# Patient Record
Sex: FEMALE | Race: WHITE | NOT HISPANIC OR LATINO | Employment: OTHER | URBAN - METROPOLITAN AREA
[De-identification: names, ages, dates, MRNs, and addresses within clinical notes are randomized per-mention and may not be internally consistent; named-entity substitution may affect disease eponyms.]

---

## 2021-07-16 ENCOUNTER — TELEPHONE (OUTPATIENT)
Dept: DERMATOLOGY | Facility: CLINIC | Age: 66
End: 2021-07-16

## 2021-07-16 NOTE — TELEPHONE ENCOUNTER
Thu 7/15/2021 4:00 PM  Patient LM wanting to schedule a new patient appointment    Returned call, left message

## 2021-07-20 PROBLEM — L40.50 PSORIATIC ARTHROPATHY (HCC): Status: ACTIVE | Noted: 2021-07-20

## 2021-07-20 PROBLEM — M47.816 LUMBAR SPONDYLOSIS: Status: ACTIVE | Noted: 2021-07-20

## 2021-12-02 PROBLEM — G43.009 MIGRAINE WITHOUT AURA AND WITHOUT STATUS MIGRAINOSUS, NOT INTRACTABLE: Status: ACTIVE | Noted: 2021-12-02

## 2021-12-02 PROBLEM — L40.59 OTHER PSORIATIC ARTHROPATHY (HCC): Status: ACTIVE | Noted: 2021-07-20

## 2021-12-02 PROBLEM — L40.0 PLAQUE PSORIASIS: Status: ACTIVE | Noted: 2021-12-02

## 2021-12-02 PROBLEM — F32.A ANXIETY AND DEPRESSION: Status: ACTIVE | Noted: 2021-12-02

## 2021-12-02 PROBLEM — F41.9 ANXIETY AND DEPRESSION: Status: ACTIVE | Noted: 2021-12-02

## 2021-12-02 PROBLEM — E78.5 HYPERLIPIDEMIA: Status: ACTIVE | Noted: 2021-12-02

## 2022-07-09 ENCOUNTER — TELEPHONE ENCOUNTER (OUTPATIENT)
Dept: URBAN - METROPOLITAN AREA CLINIC 121 | Facility: CLINIC | Age: 67
End: 2022-07-09

## 2022-07-09 RX ORDER — SIMVASTATIN 40 MG/1
TABLET, FILM COATED ORAL
Refills: 0 | OUTPATIENT
Start: 2011-02-28 | End: 2011-04-21

## 2022-07-09 RX ORDER — PREDNISONE 5 MG/1
TABLET ORAL
Refills: 0 | OUTPATIENT
Start: 2011-02-28 | End: 2011-04-21

## 2022-07-10 ENCOUNTER — TELEPHONE ENCOUNTER (OUTPATIENT)
Dept: URBAN - METROPOLITAN AREA CLINIC 121 | Facility: CLINIC | Age: 67
End: 2022-07-10

## 2022-07-10 RX ORDER — SIMVASTATIN 40 MG/1
TABLET, FILM COATED ORAL
Refills: 0 | Status: ACTIVE | COMMUNITY
Start: 2011-04-21

## 2022-07-10 RX ORDER — HYDROCORTISONE ACETATE 25 MG/1
SUPPOSITORY RECTAL ONCE A DAY
Refills: 2 | Status: ACTIVE | COMMUNITY
Start: 2011-02-28

## 2022-07-10 RX ORDER — PREDNISONE 5 MG/1
TABLET ORAL
Refills: 0 | Status: ACTIVE | COMMUNITY
Start: 2011-04-21

## 2024-02-24 LAB
ALBUMIN SERPL-MCNC: 4.6 G/DL (ref 3.6–5.1)
ALBUMIN/GLOB SERPL: 1.8 (CALC) (ref 1–2.5)
ALP SERPL-CCNC: 65 U/L (ref 37–153)
ALT SERPL-CCNC: 28 U/L (ref 6–29)
APPEARANCE UR: CLEAR
AST SERPL-CCNC: 29 U/L (ref 10–35)
BACTERIA UR QL AUTO: ABNORMAL /HPF
BASOPHILS # BLD AUTO: 30 CELLS/UL (ref 0–200)
BASOPHILS NFR BLD AUTO: 0.4 %
BILIRUB SERPL-MCNC: 1 MG/DL (ref 0.2–1.2)
BILIRUB UR QL STRIP: NEGATIVE
BUN SERPL-MCNC: 11 MG/DL (ref 7–25)
BUN/CREAT SERPL: ABNORMAL (CALC) (ref 6–22)
CALCIUM SERPL-MCNC: 9.6 MG/DL (ref 8.6–10.4)
CHLORIDE SERPL-SCNC: 104 MMOL/L (ref 98–110)
CO2 SERPL-SCNC: 28 MMOL/L (ref 20–32)
COLOR UR: YELLOW
CREAT SERPL-MCNC: 0.73 MG/DL (ref 0.6–1)
CRP SERPL-MCNC: 1.4 MG/L
EOSINOPHIL # BLD AUTO: 203 CELLS/UL (ref 15–500)
EOSINOPHIL NFR BLD AUTO: 2.7 %
ERYTHROCYTE [DISTWIDTH] IN BLOOD BY AUTOMATED COUNT: 12.4 % (ref 11–15)
ERYTHROCYTE [SEDIMENTATION RATE] IN BLOOD BY WESTERGREN METHOD: 2 MM/H
GFR/BSA.PRED SERPLBLD CYS-BASED-ARV: 88 ML/MIN/1.73M2
GLOBULIN SER CALC-MCNC: 2.5 G/DL (CALC) (ref 1.9–3.7)
GLUCOSE SERPL-MCNC: 103 MG/DL (ref 65–99)
GLUCOSE UR QL STRIP: NEGATIVE
HCT VFR BLD AUTO: 42.8 % (ref 35–45)
HGB BLD-MCNC: 14.5 G/DL (ref 11.7–15.5)
HGB UR QL STRIP: NEGATIVE
HYALINE CASTS #/AREA URNS LPF: ABNORMAL /LPF
KETONES UR QL STRIP: NEGATIVE
LEUKOCYTE ESTERASE UR QL STRIP: ABNORMAL
LYMPHOCYTES # BLD AUTO: 2873 CELLS/UL (ref 850–3900)
LYMPHOCYTES NFR BLD AUTO: 38.3 %
M TB IFN-G CD4+ BCKGRND COR BLD-ACNC: 0.07 IU/ML
M TB IFN-G CD4+ BCKGRND COR BLD-ACNC: 0.08 IU/ML
M TB IFN-G CD4+ T-CELLS BLD-ACNC: 0.03 IU/ML
M TB TUBERC IFN-G BLD QL: NEGATIVE
M TB TUBERC IGNF/MITOGEN IGNF CONTROL: 9.41 IU/ML
MCH RBC QN AUTO: 30.3 PG (ref 27–33)
MCHC RBC AUTO-ENTMCNC: 33.9 G/DL (ref 32–36)
MCV RBC AUTO: 89.4 FL (ref 80–100)
MONOCYTES # BLD AUTO: 600 CELLS/UL (ref 200–950)
MONOCYTES NFR BLD AUTO: 8 %
NEUTROPHILS # BLD AUTO: 3795 CELLS/UL (ref 1500–7800)
NEUTROPHILS NFR BLD AUTO: 50.6 %
NITRITE UR QL STRIP: NEGATIVE
PH UR STRIP: 6.5 [PH] (ref 5–8)
PLATELET # BLD AUTO: 289 THOUSAND/UL (ref 140–400)
PMV BLD REES-ECKER: 11 FL (ref 7.5–12.5)
POTASSIUM SERPL-SCNC: 4.3 MMOL/L (ref 3.5–5.3)
PROT SERPL-MCNC: 7.1 G/DL (ref 6.1–8.1)
PROT UR QL STRIP: NEGATIVE
RBC # BLD AUTO: 4.79 MILLION/UL (ref 3.8–5.1)
RBC #/AREA URNS HPF: ABNORMAL /HPF
SODIUM SERPL-SCNC: 139 MMOL/L (ref 135–146)
SP GR UR STRIP: 1.01 (ref 1–1.03)
SQUAMOUS #/AREA URNS HPF: ABNORMAL /HPF
WBC # BLD AUTO: 7.5 THOUSAND/UL (ref 3.8–10.8)
WBC #/AREA URNS HPF: ABNORMAL /HPF

## 2024-02-29 ENCOUNTER — OFFICE VISIT (OUTPATIENT)
Age: 69
End: 2024-02-29

## 2024-02-29 VITALS
HEIGHT: 64 IN | HEART RATE: 85 BPM | DIASTOLIC BLOOD PRESSURE: 60 MMHG | OXYGEN SATURATION: 99 % | BODY MASS INDEX: 27.42 KG/M2 | TEMPERATURE: 97.4 F | SYSTOLIC BLOOD PRESSURE: 114 MMHG | WEIGHT: 160.6 LBS

## 2024-02-29 DIAGNOSIS — M47.816 LUMBAR SPONDYLOSIS: ICD-10-CM

## 2024-02-29 DIAGNOSIS — L40.59 OTHER PSORIATIC ARTHROPATHY (HCC): Primary | ICD-10-CM

## 2024-02-29 DIAGNOSIS — Z79.899 ENCOUNTER FOR LONG-TERM (CURRENT) USE OF OTHER MEDICATIONS: ICD-10-CM

## 2024-02-29 NOTE — PROGRESS NOTES
Assessment/Plan:    Other psoriatic arthropathy (HCC)  Her psoriatic arthritis is well-controlled with Taltz.  No signs of active inflammation or synovitis on exam today.  We will continue to monitor her response to treatment.  Labs as ordered to monitor for medication side effects and toxicity.  She is up-to-date with her QuantiFERON gold testing.  Follow-up in 4 to 6 months or sooner if needed.    Lumbar spondylosis  Chronic lower back pain is generally stable.  Continue regular exercise and stretching program.    Migraine without aura and without status migrainosus, not intractable  Migraines are generally stable.  Follow-up with PCP.       Diagnoses and all orders for this visit:    Other psoriatic arthropathy (HCC)  -     CBC and differential; Future  -     Comprehensive metabolic panel; Future  -     Sedimentation rate, automated; Future  -     C-reactive protein; Future  -     UA (URINE) with reflex to Scope    Encounter for long-term (current) use of other medications  -     CBC and differential; Future  -     Comprehensive metabolic panel; Future  -     Sedimentation rate, automated; Future  -     C-reactive protein; Future  -     UA (URINE) with reflex to Scope    Lumbar spondylosis        Spent 30 minutes total reviewing labs, chart notes, imaging studies, performing history and physical exam, discussing medication and treatment, counseling patient, and completing chart note.          Subjective:      Patient ID: Claire Bejarano is a 69 y.o. female.    She is here for follow-up of her psoriatic arthritis.  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  She has previously been treated with Remicade, Enbrel, Humira, Arava, and methotrexate.  She is currently being treated with Taltz which she started in April 2021.  She is feeling well and her psoriatic arthritis symptoms are well-controlled with Taltz.  She has no significant joint pain.  She has no swelling in her joints and no signs of  "dactylitis or enthesitis.  Her psoriasis is quiescent.    She has a history of chronic lower back pain with occasional radicular symptoms.  She did have an x-ray done of her lumbar spine on 5/2/2023.  This revealed mild scoliosis along with degenerative changes.  She has no signs of inflammatory spondyloarthritis.  Her symptoms are most noticeable with certain activities such as cleaning however she does get relief with rest.  She is consistent with a stretching program which does help her symptoms.  She also stays very active.  She has a history of migraines however these are generally stable.    She had labs done on 2/21/2024.  CBC unremarkable.  Glucose 103.  Creatinine 0.73, GFR 88.  ESR, CRP within normal limits.  QuantiFERON gold negative.        The following portions of the patient's history were reviewed and updated as appropriate: allergies, current medications, past family history, past medical history, past social history, past surgical history, and problem list.    Review of Systems   Constitutional:  Positive for fatigue. Negative for chills and fever.   HENT:  Negative for hearing loss, sore throat and tinnitus.    Eyes:  Negative for pain and visual disturbance.   Respiratory:  Negative for cough and shortness of breath.    Gastrointestinal:  Negative for abdominal pain, nausea and vomiting.   Genitourinary:  Negative for difficulty urinating.   Musculoskeletal:  Positive for arthralgias and back pain. Negative for gait problem, joint swelling, myalgias, neck pain and neck stiffness.   Skin:  Negative for rash.   Neurological:  Positive for headaches (migraines). Negative for dizziness, seizures, weakness and numbness.   Psychiatric/Behavioral:  Negative for confusion, decreased concentration and sleep disturbance.          Objective:      /60 (BP Location: Left arm, Patient Position: Sitting, Cuff Size: Adult)   Pulse 85   Temp (!) 97.4 °F (36.3 °C) (Tympanic)   Ht 5' 3.75\" (1.619 m)   Wt " 72.8 kg (160 lb 9.6 oz)   SpO2 99%   BMI 27.78 kg/m²          Physical Exam  Constitutional:       Appearance: Normal appearance.   Cardiovascular:      Rate and Rhythm: Normal rate and regular rhythm.   Pulmonary:      Breath sounds: Normal breath sounds.   Musculoskeletal:         General: No swelling.      Comments: Slightly decreased lateral flexion neck.  Full range of motion bilateral shoulders, elbows.  Decreased flexion, decreased extension bilateral wrists.  Multiple deformities scattered PIP joints.  Interphalangeal OA changes, squaring 1st CMC joints bilaterally.  Boutonniere deformity right middle finger, right ring finger.  Full range of motion bilateral hips, knees, ankles.  Patellofemoral crepitus noted bilateral knees.  Rearfoot hyperpronation, hammertoe deformities, midfoot cavus deformities bilateral feet.   Skin:     General: Skin is warm and dry.   Neurological:      General: No focal deficit present.      Mental Status: She is alert.         Dragon Dictation software was used to dictate this note. It may contain errors with dictating incorrect words/spelling. Please contact provider directly for any questions.

## 2024-02-29 NOTE — ASSESSMENT & PLAN NOTE
Her psoriatic arthritis is well-controlled with Taltz.  No signs of active inflammation or synovitis on exam today.  We will continue to monitor her response to treatment.  Labs as ordered to monitor for medication side effects and toxicity.  She is up-to-date with her QuantiFERON gold testing.  Follow-up in 4 to 6 months or sooner if needed.

## 2024-06-16 DIAGNOSIS — L40.59 POLYARTICULAR PSORIATIC ARTHRITIS (HCC): ICD-10-CM

## 2024-06-16 RX ORDER — IXEKIZUMAB 80 MG/ML
INJECTION, SOLUTION SUBCUTANEOUS
Qty: 3 ML | Refills: 1 | Status: SHIPPED | OUTPATIENT
Start: 2024-06-16

## 2024-08-09 LAB
CRP SERPL-MCNC: 3.9 MG/L
ERYTHROCYTE [SEDIMENTATION RATE] IN BLOOD BY WESTERGREN METHOD: 2 MM/H

## 2024-08-28 NOTE — PROGRESS NOTES
Ambulatory Visit  Name: Claire Bejarano      : 1955      MRN: [id number]  Encounter Provider: Nila Malik PA-C  Encounter Date: 2024   Encounter department: [facility] RHEUMATOLOGY [facility]    Assessment & Plan   1. Other psoriatic arthropathy (HCC)  Assessment & Plan:  Her psoriatic arthritis symptoms are well-controlled with Taltz.  No signs of active inflammation or synovitis on exam today.  We will continue to monitor her response to treatment.  Labs as ordered to monitor for medication side effects and toxicity.  She is up-to-date with her QuantiFERON gold testing.  Follow-up in 4 to 6 months or sooner if needed.  Orders:  -     CBC and differential; Future  -     Comprehensive metabolic panel; Future  -     Sedimentation rate, automated; Future  -     C-reactive protein; Future  -     Quantiferon TB Gold Plus Assay; Future  2. Lumbar spondylosis  Assessment & Plan:  Chronic lower back pain due to lumbar spondylosis.  Her symptoms are generally stable.  Encouraged continued home exercise program.  3. Encounter for long-term (current) use of other medications  -     CBC and differential; Future  -     Comprehensive metabolic panel; Future  -     Sedimentation rate, automated; Future  -     C-reactive protein; Future  -     Quantiferon TB Gold Plus Assay; Future      History of Present Illness     Claire Bejarano is a 69 y.o. female.  She is here for follow-up of her psoriatic arthritis.  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  She has previously been treated with Remicade, Enbrel, Humira, Arava, and methotrexate.  She is currently being treated with Taltz which she started in 2021.  She is feeling well and her psoriatic arthritis symptoms are stable with Taltz.  She has minimal stiffness in the morning.  She has no swelling in her joints.  She has no signs of dactylitis or enthesitis.  Her psoriasis is quiescent as well.     She has a history of  chronic lower back pain with occasional radicular symptoms.  She did have an x-ray done of her lumbar spine on 5/2/2023.  This revealed mild scoliosis along with degenerative changes.  She has no signs of inflammatory spondyloarthritis.  Her symptoms are most noticeable with certain activities such as cleaning however she does get relief with rest.  She is consistent with a stretching program which does help her symptoms.  She also stays very active.  She has a history of migraines however these are generally stable.      She had labs done on 8/8/2024.  CBC, CMP unremarkable.  ESR, CRP within normal limits.  Vitamin D 44.  She is up-to-date with her QuantiFERON gold testing and had a negative test on 2/21/2024.    Review of Systems   Constitutional:  Positive for fatigue. Negative for chills and fever.   HENT:  Negative for hearing loss, sore throat and tinnitus.    Eyes:  Negative for pain and visual disturbance.   Respiratory:  Negative for cough and shortness of breath.    Gastrointestinal:  Negative for abdominal pain, nausea and vomiting.   Genitourinary:  Negative for difficulty urinating.   Musculoskeletal:  Positive for arthralgias and back pain. Negative for gait problem, joint swelling, myalgias, neck pain and neck stiffness.   Skin:  Negative for rash.   Neurological:  Positive for headaches (migraines). Negative for dizziness, seizures, weakness and numbness.   Psychiatric/Behavioral:  Negative for confusion, decreased concentration and sleep disturbance.      Current Outpatient Medications on File Prior to Visit   Medication Sig Dispense Refill    Cholecalciferol (Vitamin D) 50 MCG (2000 UT) tablet Take 2,000 Units by mouth daily      ixekizumab (Taltz) 80 MG/ML subcutaneous injection INJECT 80MG (1 PEN) UNDER THE SKIN EVERY 4 WEEKS 3 mL 1    omeprazole (PriLOSEC) 20 mg delayed release capsule Take 20 mg by mouth daily      rosuvastatin (CRESTOR) 10 MG tablet Take 10 mg by mouth daily      SUMAtriptan  "(IMITREX) 50 mg tablet Take 50 mg by mouth once as needed for migraine      venlafaxine (EFFEXOR) 75 mg tablet Take 75 mg by mouth daily      escitalopram (LEXAPRO) 5 mg tablet Take 5 mg by mouth daily (Patient not taking: Reported on 8/9/2022)       MG tablet TAKE 1 TABLET EVERY 6 HOURS AS NEEDED FOR MILD PAIN (Patient not taking: Reported on 2/29/2024) 120 tablet 5     No current facility-administered medications on file prior to visit.      Objective     /78 (BP Location: Left arm, Patient Position: Sitting, Cuff Size: Adult)   Pulse 78   Temp 97.8 °F (36.6 °C) (Tympanic)   Ht 5' 4\" (1.626 m)   Wt 70.8 kg (156 lb)   SpO2 96%   BMI 26.78 kg/m²     Physical Exam  Constitutional:       Appearance: Normal appearance.   Cardiovascular:      Rate and Rhythm: Normal rate and regular rhythm.   Pulmonary:      Breath sounds: Normal breath sounds.   Musculoskeletal:      Comments: Slightly decreased lateral flexion neck.  Full range of motion bilateral shoulders, elbows.  Decreased flexion, decreased extension bilateral wrists.  Multiple deformities scattered PIP joints.  Interphalangeal OA changes, squaring 1st CMC joints bilaterally.  Boutonniere deformity right middle finger, right ring finger.  Full range of motion bilateral hips, knees, ankles.  Patellofemoral crepitus noted bilateral knees.  Rearfoot hyperpronation, hammertoe deformities, midfoot cavus deformities bilateral feet.    Skin:     General: Skin is warm and dry.   Neurological:      General: No focal deficit present.      Mental Status: She is alert.       Administrative Statements         Dragon Dictation software was used to dictate this note. It may contain errors with dictating incorrect words/spelling. Please contact provider directly for any questions.    "

## 2024-08-29 ENCOUNTER — OFFICE VISIT (OUTPATIENT)
Age: 69
End: 2024-08-29
Payer: COMMERCIAL

## 2024-08-29 VITALS
SYSTOLIC BLOOD PRESSURE: 116 MMHG | BODY MASS INDEX: 26.63 KG/M2 | TEMPERATURE: 97.8 F | HEART RATE: 78 BPM | HEIGHT: 64 IN | OXYGEN SATURATION: 96 % | WEIGHT: 156 LBS | DIASTOLIC BLOOD PRESSURE: 78 MMHG

## 2024-08-29 DIAGNOSIS — M47.816 LUMBAR SPONDYLOSIS: ICD-10-CM

## 2024-08-29 DIAGNOSIS — L40.59 OTHER PSORIATIC ARTHROPATHY (HCC): Primary | ICD-10-CM

## 2024-08-29 DIAGNOSIS — Z79.899 ENCOUNTER FOR LONG-TERM (CURRENT) USE OF OTHER MEDICATIONS: ICD-10-CM

## 2024-08-29 PROCEDURE — 99214 OFFICE O/P EST MOD 30 MIN: CPT | Performed by: PHYSICIAN ASSISTANT

## 2024-08-29 NOTE — ASSESSMENT & PLAN NOTE
Chronic lower back pain due to lumbar spondylosis.  Her symptoms are generally stable.  Encouraged continued home exercise program.

## 2024-08-29 NOTE — ASSESSMENT & PLAN NOTE
Her psoriatic arthritis symptoms are well-controlled with Taltz.  No signs of active inflammation or synovitis on exam today.  We will continue to monitor her response to treatment.  Labs as ordered to monitor for medication side effects and toxicity.  She is up-to-date with her QuantiFERON gold testing.  Follow-up in 4 to 6 months or sooner if needed.

## 2024-11-08 DIAGNOSIS — L40.59 POLYARTICULAR PSORIATIC ARTHRITIS (HCC): ICD-10-CM

## 2024-11-08 RX ORDER — IXEKIZUMAB 80 MG/ML
INJECTION, SOLUTION SUBCUTANEOUS
Qty: 3 ML | Refills: 0 | Status: SHIPPED | OUTPATIENT
Start: 2024-11-08

## 2024-12-30 ENCOUNTER — TELEPHONE (OUTPATIENT)
Age: 69
End: 2024-12-30

## 2025-01-06 ENCOUNTER — TELEPHONE (OUTPATIENT)
Age: 70
End: 2025-01-06

## 2025-01-06 NOTE — TELEPHONE ENCOUNTER
Patient is calling because they would like to have their blood work mailed to them as they go [facility] thank you

## 2025-01-14 ENCOUNTER — PREP FOR PROCEDURE (OUTPATIENT)
Age: 70
End: 2025-01-14

## 2025-01-14 ENCOUNTER — TELEPHONE (OUTPATIENT)
Age: 70
End: 2025-01-14

## 2025-01-14 DIAGNOSIS — Z12.11 SCREENING FOR COLON CANCER: Primary | ICD-10-CM

## 2025-01-14 NOTE — TELEPHONE ENCOUNTER
Scheduled date of colonoscopy (as of today): 2/4/25  Physician performing colonoscopy:   Location of colonoscopy: [place]  Bowel prep reviewed with patient: Miralax Dulcolax prep instructions reviewed and sent via mail  Instructions reviewed with patient by: md  Clearances:       Patient requesting prep instructions sent via mail. Home address verified.

## 2025-01-14 NOTE — TELEPHONE ENCOUNTER
25  Screened by: Elise Forbes MA    Referring Provider Douglas Shoenberger    Pre- Screenin'4  127 lbs  BMI  21.8  Has patient been referred for a routine screening Colonoscopy? yes  Is the patient between 45-75 years old? yes      Previous Colonoscopy yes   If yes:    Date:     Facility: facility in [place]     Reason: Screening      Does the patient want to see a Gastroenterologist prior to their procedure OR are they having any GI symptoms? no    Has the patient been hospitalized or had abdominal surgery in the past 6 months? no    Does the patient use supplemental oxygen? no    Does the patient take Coumadin, Lovenox, Plavix, Elliquis, Xarelto, or other blood thinning medication? no    Has the patient had a stroke, cardiac event, or stent placed in the past year? no      If patient is between 45yrs - 49yrs, please advise patient that we will have to confirm benefits & coverage with their insurance company for a routine screening colonoscopy.

## 2025-01-27 ENCOUNTER — TELEPHONE (OUTPATIENT)
Dept: GASTROENTEROLOGY | Facility: CLINIC | Age: 70
End: 2025-01-27

## 2025-01-27 NOTE — TELEPHONE ENCOUNTER
Procedure confirmed  Colonoscopy     Via: Spoke with patient    Instructions given: Mail     Prep Given: Miralax/Dulcolax    Call the office if there are any questions.

## 2025-02-04 ENCOUNTER — ANESTHESIA EVENT (OUTPATIENT)
Dept: GASTROENTEROLOGY | Facility: HOSPITAL | Age: 70
End: 2025-02-04
Payer: COMMERCIAL

## 2025-02-04 ENCOUNTER — HOSPITAL ENCOUNTER (OUTPATIENT)
Dept: GASTROENTEROLOGY | Facility: HOSPITAL | Age: 70
Setting detail: OUTPATIENT SURGERY
Discharge: HOME | End: 2025-02-04
Attending: INTERNAL MEDICINE
Payer: COMMERCIAL

## 2025-02-04 ENCOUNTER — ANESTHESIA (OUTPATIENT)
Dept: GASTROENTEROLOGY | Facility: HOSPITAL | Age: 70
End: 2025-02-04
Payer: COMMERCIAL

## 2025-02-04 VITALS
DIASTOLIC BLOOD PRESSURE: 88 MMHG | OXYGEN SATURATION: 99 % | RESPIRATION RATE: 14 BRPM | SYSTOLIC BLOOD PRESSURE: 141 MMHG | HEART RATE: 71 BPM | TEMPERATURE: 97.5 F

## 2025-02-04 DIAGNOSIS — Z12.11 SCREENING FOR COLON CANCER: ICD-10-CM

## 2025-02-04 PROCEDURE — G0121 COLON CA SCRN NOT HI RSK IND: HCPCS | Performed by: STUDENT IN AN ORGANIZED HEALTH CARE EDUCATION/TRAINING PROGRAM

## 2025-02-04 RX ORDER — LIDOCAINE HYDROCHLORIDE 10 MG/ML
INJECTION, SOLUTION EPIDURAL; INFILTRATION; INTRACAUDAL; PERINEURAL AS NEEDED
Status: DISCONTINUED | OUTPATIENT
Start: 2025-02-04 | End: 2025-02-04

## 2025-02-04 RX ORDER — PROPOFOL 10 MG/ML
INJECTION, EMULSION INTRAVENOUS AS NEEDED
Status: DISCONTINUED | OUTPATIENT
Start: 2025-02-04 | End: 2025-02-04

## 2025-02-04 RX ORDER — SODIUM CHLORIDE 9 MG/ML
INJECTION, SOLUTION INTRAVENOUS CONTINUOUS PRN
Status: DISCONTINUED | OUTPATIENT
Start: 2025-02-04 | End: 2025-02-04

## 2025-02-04 RX ADMIN — PROPOFOL 50 MG: 10 INJECTION, EMULSION INTRAVENOUS at 10:33

## 2025-02-04 RX ADMIN — PROPOFOL 30 MG: 10 INJECTION, EMULSION INTRAVENOUS at 10:42

## 2025-02-04 RX ADMIN — LIDOCAINE HYDROCHLORIDE 50 MG: 10 INJECTION, SOLUTION EPIDURAL; INFILTRATION; INTRACAUDAL; PERINEURAL at 10:33

## 2025-02-04 RX ADMIN — PROPOFOL 50 MG: 10 INJECTION, EMULSION INTRAVENOUS at 10:36

## 2025-02-04 RX ADMIN — PROPOFOL 30 MG: 10 INJECTION, EMULSION INTRAVENOUS at 10:39

## 2025-02-04 RX ADMIN — PROPOFOL 30 MG: 10 INJECTION, EMULSION INTRAVENOUS at 10:45

## 2025-02-04 RX ADMIN — PROPOFOL 30 MG: 10 INJECTION, EMULSION INTRAVENOUS at 10:51

## 2025-02-04 RX ADMIN — SODIUM CHLORIDE: 0.9 INJECTION, SOLUTION INTRAVENOUS at 10:17

## 2025-02-04 RX ADMIN — PROPOFOL 30 MG: 10 INJECTION, EMULSION INTRAVENOUS at 10:48

## 2025-02-04 NOTE — H&P
History and Physical -  [facility]  Claire Bejarano 70 y.o. female MRN: [id number]    HPI: Claire Bejarano is a 70 y.o. female who presents for colonoscopy    REVIEW OF SYSTEMS: Per the HPI, and otherwise unremarkable.    Historical Information   Past Medical History:   Diagnosis Date    Anxiety and depression     Cervical cancer (HCC)     Hyperlipidemia     Lumbar spondylosis     Migraines     Osteoarthritis     Psoriasis     Psoriatic arthritis (HCC)     Uterine cancer (HCC)      Past Surgical History:   Procedure Laterality Date    PARTIAL HYSTERECTOMY       Social History   Social History     Substance and Sexual Activity   Alcohol Use Not Currently     Social History     Substance and Sexual Activity   Drug Use Not on file     Social History     Tobacco Use   Smoking Status Never   Smokeless Tobacco Never     Family History   Problem Relation Age of Onset    Arthritis Family     Diabetes Family     Cancer Family     Heart disease Family        Meds/Allergies       Current Outpatient Medications:     Cholecalciferol (Vitamin D) 50 MCG (2000 UT) tablet    rosuvastatin (CRESTOR) 10 MG tablet    SUMAtriptan (IMITREX) 50 mg tablet    venlafaxine (EFFEXOR) 75 mg tablet    Taltz 80 MG/ML subcutaneous injection    Allergies   Allergen Reactions    Methotrexate Confusion       Objective     /76   Pulse 85   Temp (!) 97.4 °F (36.3 °C) (Temporal)   Resp 18   SpO2 97%     PHYSICAL EXAM    General Appearance: NAD, cooperative, alert  Eyes: Anicteric  GI:  Soft, non-tender, non-distended; normal bowel sounds; no masses, no organomegaly   Rectal: Deferred until procedure  Musculoskeletal: No edema.  Skin:  No jaundice    ASSESSMENT/PLAN:  This is a 70 y.o. female here for colonoscopy, and she is stable and optimized for her procedure.

## 2025-02-04 NOTE — ANESTHESIA PREPROCEDURE EVALUATION
Procedure:  COLONOSCOPY    Relevant Problems   CARDIO   (+) Hyperlipidemia   (+) Migraine without aura and without status migrainosus, not intractable      MUSCULOSKELETAL   (+) Lumbar spondylosis   (+) Other psoriatic arthropathy (HCC)      NEURO/PSYCH   (+) Anxiety and depression   (+) Migraine without aura and without status migrainosus, not intractable        Physical Exam    Airway    Mallampati score: II  TM Distance: >3 FB  Neck ROM: full     Dental   No notable dental hx     Cardiovascular      Pulmonary      Other Findings  post-pubertal.      Anesthesia Plan  ASA Score- 2     Anesthesia Type- IV sedation with anesthesia with ASA Monitors.         Additional Monitors:     Airway Plan:            Plan Factors-Exercise tolerance (METS): >4 METS.    Chart reviewed.    Patient summary reviewed.    Patient is not a current smoker.              Induction- intravenous.    Postoperative Plan-         Informed Consent- Anesthetic plan and risks discussed with patient.  I personally reviewed this patient with the CRNA. Discussed and agreed on the Anesthesia Plan with the CRNA..      NPO Status:  Vitals Value Taken Time   Date of last liquid 02/04/25 02/04/25 0924   Time of last liquid 0000 02/04/25 0924   Date of last solid 02/02/25 02/04/25 0924   Time of last solid 1700 02/04/25 0924

## 2025-02-04 NOTE — ANESTHESIA POSTPROCEDURE EVALUATION
Post-Op Assessment Note    CV Status:  Stable  Pain Score: 0    Pain management: adequate       Mental Status:  Alert and awake   Hydration Status:  Euvolemic   PONV Controlled:  Controlled   Airway Patency:  Patent     Post Op Vitals Reviewed: Yes    No anethesia notable event occurred.    Staff: CRNA       Last Filed PACU Vitals:  Vitals Value Taken Time   Temp     Pulse 73 02/04/25 1100   /52 02/04/25 1100   Resp 17 02/04/25 1100   SpO2 97 % 02/04/25 1100

## 2025-02-27 LAB
ALBUMIN SERPL-MCNC: 4.5 G/DL (ref 3.6–5.1)
ALBUMIN/GLOB SERPL: 1.8 (CALC) (ref 1–2.5)
ALP SERPL-CCNC: 58 U/L (ref 37–153)
ALT SERPL-CCNC: 17 U/L (ref 6–29)
AST SERPL-CCNC: 22 U/L (ref 10–35)
BASOPHILS # BLD AUTO: 27 CELLS/UL (ref 0–200)
BASOPHILS NFR BLD AUTO: 0.4 %
BILIRUB SERPL-MCNC: 0.9 MG/DL (ref 0.2–1.2)
BUN SERPL-MCNC: 8 MG/DL (ref 7–25)
BUN/CREAT SERPL: 14 (CALC) (ref 6–22)
CALCIUM SERPL-MCNC: 9.7 MG/DL (ref 8.6–10.4)
CHLORIDE SERPL-SCNC: 104 MMOL/L (ref 98–110)
CO2 SERPL-SCNC: 30 MMOL/L (ref 20–32)
CREAT SERPL-MCNC: 0.59 MG/DL (ref 0.6–1)
CRP SERPL-MCNC: <3 MG/L
EOSINOPHIL # BLD AUTO: 163 CELLS/UL (ref 15–500)
EOSINOPHIL NFR BLD AUTO: 2.4 %
ERYTHROCYTE [DISTWIDTH] IN BLOOD BY AUTOMATED COUNT: 12.4 % (ref 11–15)
ERYTHROCYTE [SEDIMENTATION RATE] IN BLOOD BY WESTERGREN METHOD: 2 MM/H
GFR/BSA.PRED SERPLBLD CYS-BASED-ARV: 96 ML/MIN/1.73M2
GLOBULIN SER CALC-MCNC: 2.5 G/DL (CALC) (ref 1.9–3.7)
GLUCOSE SERPL-MCNC: 91 MG/DL (ref 65–99)
HCT VFR BLD AUTO: 42 % (ref 35–45)
HGB BLD-MCNC: 14.2 G/DL (ref 11.7–15.5)
LYMPHOCYTES # BLD AUTO: 2414 CELLS/UL (ref 850–3900)
LYMPHOCYTES NFR BLD AUTO: 35.5 %
M TB IFN-G CD4+ BCKGRND COR BLD-ACNC: 0.04 IU/ML
M TB IFN-G CD4+ BCKGRND COR BLD-ACNC: 0.05 IU/ML
M TB IFN-G CD4+ T-CELLS BLD-ACNC: 0.02 IU/ML
M TB TUBERC IFN-G BLD QL: NEGATIVE
M TB TUBERC IGNF/MITOGEN IGNF CONTROL: 8.58 IU/ML
MCH RBC QN AUTO: 30.5 PG (ref 27–33)
MCHC RBC AUTO-ENTMCNC: 33.8 G/DL (ref 32–36)
MCV RBC AUTO: 90.3 FL (ref 80–100)
MONOCYTES # BLD AUTO: 551 CELLS/UL (ref 200–950)
MONOCYTES NFR BLD AUTO: 8.1 %
NEUTROPHILS # BLD AUTO: 3645 CELLS/UL (ref 1500–7800)
NEUTROPHILS NFR BLD AUTO: 53.6 %
PLATELET # BLD AUTO: 292 THOUSAND/UL (ref 140–400)
PMV BLD REES-ECKER: 10.9 FL (ref 7.5–12.5)
POTASSIUM SERPL-SCNC: 4.7 MMOL/L (ref 3.5–5.3)
PROT SERPL-MCNC: 7 G/DL (ref 6.1–8.1)
RBC # BLD AUTO: 4.65 MILLION/UL (ref 3.8–5.1)
SODIUM SERPL-SCNC: 140 MMOL/L (ref 135–146)
WBC # BLD AUTO: 6.8 THOUSAND/UL (ref 3.8–10.8)

## 2025-03-05 ENCOUNTER — OFFICE VISIT (OUTPATIENT)
Age: 70
End: 2025-03-05
Payer: COMMERCIAL

## 2025-03-05 VITALS
HEIGHT: 63 IN | WEIGHT: 126.8 LBS | TEMPERATURE: 97 F | SYSTOLIC BLOOD PRESSURE: 116 MMHG | HEART RATE: 89 BPM | BODY MASS INDEX: 22.47 KG/M2 | DIASTOLIC BLOOD PRESSURE: 70 MMHG | OXYGEN SATURATION: 100 %

## 2025-03-05 DIAGNOSIS — L40.59 OTHER PSORIATIC ARTHROPATHY (HCC): Primary | ICD-10-CM

## 2025-03-05 DIAGNOSIS — Z79.899 ENCOUNTER FOR LONG-TERM (CURRENT) USE OF MEDICATIONS: ICD-10-CM

## 2025-03-05 PROCEDURE — 99214 OFFICE O/P EST MOD 30 MIN: CPT | Performed by: PHYSICIAN ASSISTANT

## 2025-03-05 RX ORDER — IXEKIZUMAB 80 MG/ML
80 INJECTION, SOLUTION SUBCUTANEOUS
Qty: 3 ML | Refills: 3 | Status: SHIPPED | OUTPATIENT
Start: 2025-03-05 | End: 2026-03-05

## 2025-03-05 NOTE — PROGRESS NOTES
Name: Claire Bejarano      : 1955      MRN: [id number]  Encounter Provider: Nila Malik PA-C  Encounter Date: 3/5/2025   Encounter department: [facility] RHEUMATOLOGY [facility]  :  Assessment & Plan  Other psoriatic arthropathy (HCC)  Her psoriatic arthritis is well-controlled with Taltz.  No signs of active inflammation or synovitis on exam today.  We will continue to monitor her response to treatment.  Labs as ordered to monitor for medication side effects and toxicity.  Follow-up in 6 months or sooner if needed.    Orders:    CBC and differential; Future    Comprehensive metabolic panel; Future    Sedimentation rate, automated; Future    C-reactive protein; Future    ixekizumab (Taltz) 80 MG/ML subcutaneous injection; Inject 1 mL (80 mg total) under the skin every 28 days    Encounter for long-term (current) use of medications    Orders:    CBC and differential; Future    Comprehensive metabolic panel; Future    Sedimentation rate, automated; Future    C-reactive protein; Future        History of Present Illness   Claire Bejarano is a 70 y.o. female.  She is here for follow-up of her psoriatic arthritis.  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  She has previously been treated with Remicade, Enbrel, Humira, Arava, and methotrexate.  She is currently being treated with Taltz which she started in 2021.  She is feeling well and her psoriatic arthritis symptoms are stable with Taltz.  She has minimal stiffness in the morning.  She has no swelling in her joints.  She has no signs of dactylitis or enthesitis.  Her psoriasis is quiescent as well.     She has a history of chronic lower back pain with occasional radicular symptoms.  She did have an x-ray done of her lumbar spine on 2023.  This revealed mild scoliosis along with degenerative changes.  She has no signs of inflammatory spondyloarthritis.  Her symptoms are most noticeable with certain activities  "such as cleaning however she does get relief with rest.  She is consistent with a stretching program which does help her symptoms.  She also stays very active.  She has a history of migraines however these are generally stable.    She had labs done on 2/25/2025.  CBC, CMP unremarkable.  ESR, CRP within normal limits.  QuantiFERON gold negative.            Review of Systems   Constitutional:  Positive for fatigue. Negative for chills and fever.   HENT:  Negative for hearing loss, sore throat and tinnitus.    Eyes:  Negative for pain and visual disturbance.   Respiratory:  Negative for cough and shortness of breath.    Gastrointestinal:  Negative for abdominal pain, nausea and vomiting.   Genitourinary:  Negative for difficulty urinating.   Musculoskeletal:  Positive for arthralgias and back pain. Negative for gait problem, joint swelling, myalgias, neck pain and neck stiffness.   Skin:  Negative for rash.   Neurological:  Positive for headaches (migraines). Negative for dizziness, seizures, weakness and numbness.   Psychiatric/Behavioral:  Negative for confusion, decreased concentration and sleep disturbance.      Current Outpatient Medications on File Prior to Visit   Medication Sig Dispense Refill    Cholecalciferol (Vitamin D) 50 MCG (2000 UT) tablet Take 2,000 Units by mouth daily      rosuvastatin (CRESTOR) 10 MG tablet Take 10 mg by mouth daily      SUMAtriptan (IMITREX) 50 mg tablet Take 50 mg by mouth once as needed for migraine      venlafaxine (EFFEXOR) 75 mg tablet Take 75 mg by mouth daily      [DISCONTINUED] Taltz 80 MG/ML subcutaneous injection INJECT 80MG (1 PEN) UNDER THE SKIN EVERY 4 WEEKS 3 mL 0     No current facility-administered medications on file prior to visit.         Objective   /70 (BP Location: Left arm, Patient Position: Sitting, Cuff Size: Adult)   Pulse 89   Temp (!) 97 °F (36.1 °C) (Tympanic)   Ht 5' 3\" (1.6 m)   Wt 57.5 kg (126 lb 12.8 oz)   SpO2 100%   BMI 22.46 kg/m²    "   Physical Exam  Constitutional:       Appearance: Normal appearance.   Cardiovascular:      Rate and Rhythm: Normal rate and regular rhythm.   Pulmonary:      Breath sounds: Normal breath sounds.   Musculoskeletal:      Comments: Slightly decreased lateral flexion neck.  Full range of motion bilateral shoulders, elbows.  Decreased flexion, decreased extension bilateral wrists.  Multiple deformities scattered PIP joints.  Interphalangeal OA changes, squaring 1st CMC joints bilaterally.  Boutonniere deformity right middle finger, right ring finger.  Full range of motion bilateral hips, knees, ankles.  Patellofemoral crepitus noted bilateral knees.  Rearfoot hyperpronation, hammertoe deformities, midfoot cavus deformities bilateral feet.    Skin:     General: Skin is warm and dry.   Neurological:      General: No focal deficit present.      Mental Status: She is alert.             Dragon Dictation software was used to dictate this note. It may contain errors with dictating incorrect words/spelling. Please contact provider directly for any questions.

## 2025-03-05 NOTE — ASSESSMENT & PLAN NOTE
Her psoriatic arthritis is well-controlled with Taltz.  No signs of active inflammation or synovitis on exam today.  We will continue to monitor her response to treatment.  Labs as ordered to monitor for medication side effects and toxicity.  Follow-up in 6 months or sooner if needed.    Orders:    CBC and differential; Future    Comprehensive metabolic panel; Future    Sedimentation rate, automated; Future    C-reactive protein; Future    ixekizumab (Taltz) 80 MG/ML subcutaneous injection; Inject 1 mL (80 mg total) under the skin every 28 days

## 2025-04-09 ENCOUNTER — TELEPHONE (OUTPATIENT)
Dept: ADMINISTRATIVE | Facility: OTHER | Age: 70
End: 2025-04-09

## 2025-04-09 NOTE — TELEPHONE ENCOUNTER
Upon review of the In Basket request we were able to locate, review, and update the patient chart as requested for DEXA Scan.    Any additional questions or concerns should be emailed to the Practice Liaisons via the appropriate education email address, please do not reply via In Basket.    Thank you  Levar Whitlock MA   [facility]

## 2025-04-09 NOTE — TELEPHONE ENCOUNTER
----- Message from Nila Malik PA-C sent at 4/9/2025 11:05 AM EDT -----  Regarding: Care Gap Request  04/09/25 11:05 AM    Hello, our patient attached above has had DEXA Scan completed/performed. Please assist in updating the patient chart by pulling the Care Everywhere (CE) document. The date of service is 02/08/2021.     Thank you,  Nila Malik PA-C   RHEUMATOLOGY [facility]

## 2025-04-23 ENCOUNTER — OFFICE VISIT (OUTPATIENT)
Age: 70
End: 2025-04-23
Payer: COMMERCIAL

## 2025-04-23 VITALS
SYSTOLIC BLOOD PRESSURE: 128 MMHG | DIASTOLIC BLOOD PRESSURE: 82 MMHG | HEIGHT: 64 IN | BODY MASS INDEX: 21.68 KG/M2 | WEIGHT: 127 LBS

## 2025-04-23 DIAGNOSIS — N90.89 VULVAR LESION: Primary | ICD-10-CM

## 2025-04-23 PROCEDURE — 56605 BIOPSY OF VULVA/PERINEUM: CPT | Performed by: OBSTETRICS & GYNECOLOGY

## 2025-04-23 PROCEDURE — 88342 IMHCHEM/IMCYTCHM 1ST ANTB: CPT | Performed by: PATHOLOGY

## 2025-04-23 PROCEDURE — 99203 OFFICE O/P NEW LOW 30 MIN: CPT | Performed by: OBSTETRICS & GYNECOLOGY

## 2025-04-23 PROCEDURE — 88305 TISSUE EXAM BY PATHOLOGIST: CPT | Performed by: PATHOLOGY

## 2025-04-23 PROCEDURE — 88344 IMHCHEM/IMCYTCHM EA MLT ANTB: CPT | Performed by: PATHOLOGY

## 2025-04-23 PROCEDURE — 88341 IMHCHEM/IMCYTCHM EA ADD ANTB: CPT | Performed by: PATHOLOGY

## 2025-04-24 NOTE — PROGRESS NOTES
Name: Claire Bejarano      : 1955      MRN: [id number]  Encounter Provider: Jacy Salinas MD  Encounter Date: 2025   Encounter department: [facility] [place]  :  Assessment & Plan  Vulvar lesion    Orders:  •  Tissue Exam        History of Present Illness     Claire Bejarano is a 70 y.o. female with medical history significant for psoriatic arthritis who presents with a left vulvar lesion that was first noted a few months ago.  Irritated at times.  No bleeding.  No h/o similar lesions.  Poor historian re:  GYN history.  History of cervical and uterine cancer in chart which patient denies.  Never took HRT.  Not sexually active.    Past Medical History:   Diagnosis Date   • Anxiety and depression    • Cervical cancer (HCC)    • Hyperlipidemia    • Lumbar spondylosis    • Migraines    • Osteoarthritis    • Psoriasis    • Psoriatic arthritis (HCC)    • Uterine cancer (HCC)      Past Surgical History:   Procedure Laterality Date   • TUBAL LIGATION         Current Outpatient Medications on File Prior to Visit   Medication Sig   • Cholecalciferol (Vitamin D) 50 MCG (2000 UT) tablet Take 2,000 Units by mouth daily   • ixekizumab (Taltz) 80 MG/ML subcutaneous injection Inject 1 mL (80 mg total) under the skin every 28 days   • rosuvastatin (CRESTOR) 10 MG tablet Take 10 mg by mouth daily   • SUMAtriptan (IMITREX) 50 mg tablet Take 50 mg by mouth once as needed for migraine   • venlafaxine (EFFEXOR) 75 mg tablet Take 75 mg by mouth daily     No current facility-administered medications on file prior to visit.       Allergies   Allergen Reactions   • Methotrexate Confusion     Received previously without reactions-        Social History     Tobacco Use   • Smoking status: Never   • Smokeless tobacco: Never   Vaping Use   • Vaping status: Never Used   Substance Use Topics   • Alcohol use: Never   • Drug use: Never       Family History   Problem Relation Age of Onset   • Arthritis Family    •  "Diabetes Family    • Cancer Family    • Heart disease Family    • Migraines Daughter        ROS:  see HPI    History obtained from: patient         Objective   /82 (BP Location: Left arm, Patient Position: Sitting, Cuff Size: Standard)   Ht 5' 3.75\" (1.619 m)   Wt 57.6 kg (127 lb)   BMI 21.97 kg/m²        Physical Exam  Constitutional:       Appearance: Normal appearance.   Genitourinary:      Genitourinary Comments: Raised verrucous lesion of left labia majora with irregular hypopigmented edges      Left Labia: lesions.              No inguinal adenopathy present in the right or left side.     No vaginal discharge, bleeding or ulceration.      Moderate vaginal atrophy present.     Cervix is not absent.      No cervical discharge, friability or polyp.   HENT:      Head: Normocephalic.   Cardiovascular:      Rate and Rhythm: Normal rate and regular rhythm.   Pulmonary:      Effort: Pulmonary effort is normal.   Abdominal:      General: There is no distension.      Palpations: Abdomen is soft.      Tenderness: There is no abdominal tenderness.   Musculoskeletal:         General: No swelling.   Lymphadenopathy:      Lower Body: No right inguinal adenopathy. No left inguinal adenopathy.   Neurological:      General: No focal deficit present.      Mental Status: She is alert and oriented to person, place, and time.   Skin:     General: Skin is warm and dry.      Coloration: Skin is not jaundiced or pale.   Psychiatric:         Mood and Affect: Mood normal.         Behavior: Behavior normal.   Vitals and nursing note reviewed. Exam conducted with a chaperone present.        Biopsy    Date/Time: 4/23/2025 2:00 PM    Performed by: Jacy Salinas MD  Authorized by: Jacy Salinas MD  Universal Protocol:  Consent: Verbal consent obtained.  Risks and benefits: risks, benefits and alternatives were discussed  Consent given by: patient  Time out: Immediately prior to procedure a \"time out\" was called to " verify the correct patient, procedure, equipment, support staff and site/side marked as required.  Patient understanding: patient states understanding of the procedure being performed  Required items: required blood products, implants, devices, and special equipment available  Patient identity confirmed: verbally with patient    Procedure Details - Lesion Biopsy:     Body area:  Anogenital    Anogenital location:  Vulva    Vaginal Lesion: No      Biopsy method: punch biopsy      Biopsy tissue type: skin    Malignancy: malignancy unknown       Skin closed with single suture of 4-0 vicryl

## 2025-04-26 PROCEDURE — 88341 IMHCHEM/IMCYTCHM EA ADD ANTB: CPT | Performed by: PATHOLOGY

## 2025-04-26 PROCEDURE — 88344 IMHCHEM/IMCYTCHM EA MLT ANTB: CPT | Performed by: PATHOLOGY

## 2025-04-26 PROCEDURE — 88342 IMHCHEM/IMCYTCHM 1ST ANTB: CPT | Performed by: PATHOLOGY

## 2025-04-26 PROCEDURE — 88305 TISSUE EXAM BY PATHOLOGIST: CPT | Performed by: PATHOLOGY

## 2025-04-28 ENCOUNTER — RESULTS FOLLOW-UP (OUTPATIENT)
Age: 70
End: 2025-04-28

## 2025-04-28 DIAGNOSIS — D07.1 VULVAR INTRAEPITHELIAL NEOPLASIA (VIN) GRADE 3: Primary | ICD-10-CM

## 2025-05-22 ENCOUNTER — CONSULT (OUTPATIENT)
Age: 70
End: 2025-05-22
Payer: COMMERCIAL

## 2025-05-22 VITALS
TEMPERATURE: 97.2 F | BODY MASS INDEX: 21.41 KG/M2 | WEIGHT: 125.4 LBS | SYSTOLIC BLOOD PRESSURE: 128 MMHG | RESPIRATION RATE: 16 BRPM | HEART RATE: 96 BPM | HEIGHT: 64 IN | OXYGEN SATURATION: 99 % | DIASTOLIC BLOOD PRESSURE: 86 MMHG

## 2025-05-22 DIAGNOSIS — D07.1 VULVAR INTRAEPITHELIAL NEOPLASIA (VIN) GRADE 3: Primary | ICD-10-CM

## 2025-05-22 PROCEDURE — 99204 OFFICE O/P NEW MOD 45 MIN: CPT | Performed by: OBSTETRICS & GYNECOLOGY

## 2025-05-22 PROCEDURE — 87624 HPV HI-RISK TYP POOLED RSLT: CPT | Performed by: OBSTETRICS & GYNECOLOGY

## 2025-05-22 PROCEDURE — 88175 CYTOPATH C/V AUTO FLUID REDO: CPT | Performed by: OBSTETRICS & GYNECOLOGY

## 2025-05-22 PROCEDURE — 99459 PELVIC EXAMINATION: CPT | Performed by: OBSTETRICS & GYNECOLOGY

## 2025-05-22 RX ORDER — ACETAMINOPHEN 325 MG/1
975 TABLET ORAL ONCE
OUTPATIENT
Start: 2025-05-22 | End: 2025-05-22

## 2025-05-22 RX ORDER — SODIUM CHLORIDE, SODIUM LACTATE, POTASSIUM CHLORIDE, CALCIUM CHLORIDE 600; 310; 30; 20 MG/100ML; MG/100ML; MG/100ML; MG/100ML
20 INJECTION, SOLUTION INTRAVENOUS CONTINUOUS
OUTPATIENT
Start: 2025-05-22

## 2025-05-22 NOTE — PATIENT INSTRUCTIONS
1. Nothing to eat or drink after midnight prior to the operation.    2. Please avoid ibuprofen, aspirin, fish oil for 7 days prior to surgery  3. Medications to take the morning of surgery with a sip of water: none

## 2025-05-22 NOTE — PROGRESS NOTES
Name: Claire Bejarano      : 1955      MRN: [id number]  Encounter Provider: Roman Martins MD  Encounter Date: 2025   Encounter department: [facility] GYNECOLOGY ONCOLOGY [facility]  :  Assessment & Plan  Vulvar intraepithelial neoplasia (LEONEL) grade 3  70-year-old with psoriatic arthritis currently on Taltz with biopsy-proven LEONEL 3 on the left superior and inferior vulva.  Her performance status is 0.  1.  We discussed the pathophysiology of HPV mediated disease.  2.  Follow-up results of Pap smear  3.  I discussed treatment options for biopsy-proven LEONEL 3 including Aldara, CO2 laser, simple vulvectomy.  She understands that the effectiveness of Aldara will likely be reduced while on immunosuppressive therapy.  She also understands that CO2 laser has a higher risk of recurrence and will not provide a final pathologic diagnosis.  4.  I discussed the risks of examination under anesthesia, simple vulvectomy.  She understands the risks of the surgery including the potential additional risk of infection while on immunosuppressive therapy, the 30 to 50% risk of wound separation and she agrees to proceed as outlined.  Consent for surgery was obtained by me in the office.  Thank you for the courtesy of this consultation.  All questions were answered by the end of the visit.  Orders:    Case request operating room: VULVECTOMY SIMPLE; Standing    CBC and Platelet; Future    Comprehensive metabolic panel; Future    EKG 12 lead; Future    XR chest pa and lateral; Future    Liquid-based pap, diagnostic      Assessment & Plan            History of Present Illness   Reason for Visit / CC: Biopsy-proven LEONEL 3   Claire Bejarano is a 70 y.o. female   History of Present Illness  Who presents as a consultation from Dr. Salinas for biopsy-proven LEONEL 3 on the left Ricky vulva.  She noted soreness on the vulvar skin/discomfort for approximately 3 months.  Vulvar biopsies were  "performed on 4/23/2025 and revealed LEONEL 3 on both the left superior and inferior vulva.  She has psoriatic arthritis and is currently on Taltz.  She has been on multiple immunosuppressive medications in the past and has contractures of her fingers and toes as a result of the arthritis.  She stated that after the biopsy, the soreness on the vulvar skin improved.  She has no postmenopausal bleeding or pelvic pain.  She is able to perform her actives of daily living without difficulty.  Pertinent Medical History   Psoriatic arthritis with contractures          Review of Systems   Constitutional:  Negative for activity change and unexpected weight change.   HENT: Negative.     Eyes: Negative.    Respiratory: Negative.     Cardiovascular: Negative.    Gastrointestinal:  Negative for abdominal distention and abdominal pain.   Endocrine: Negative.    Genitourinary:  Positive for genital sores. Negative for pelvic pain and vaginal bleeding.   Musculoskeletal:  Positive for arthralgias.   Skin: Negative.    Allergic/Immunologic: Negative.    Neurological: Negative.    Hematological: Negative.    Psychiatric/Behavioral: Negative.      A complete review of systems is negative other than that noted above in the HPI.  Past Medical History   Past Medical History[1]  Past Surgical History[2]  Family History[3]   reports that she has never smoked. She has never used smokeless tobacco. She reports that she does not drink alcohol and does not use drugs.  Current Outpatient Medications   Medication Instructions    rosuvastatin (CRESTOR) 10 mg, Daily    SUMAtriptan (IMITREX) 50 mg, Once as needed    Taltz 80 mg, Subcutaneous, Every 28 days    venlafaxine (EFFEXOR) 75 mg, Daily    Vitamin D 2,000 Units, Daily   Allergies[4]      Objective   /86 (BP Location: Right arm, Patient Position: Sitting, Cuff Size: Standard)   Pulse 96   Temp (!) 97.2 °F (36.2 °C) (Temporal)   Resp 16   Ht 5' 3.75\" (1.619 m)   Wt 56.9 kg (125 lb 6.4 " oz)   SpO2 99%   BMI 21.69 kg/m²     Body mass index is 21.69 kg/m².  Pain Screening:  Pain Score: 0-No pain  ECOG   0  Physical Exam  Vitals reviewed. Exam conducted with a chaperone present.   Constitutional:       General: She is not in acute distress.     Appearance: Normal appearance. She is well-developed and normal weight. She is not ill-appearing, toxic-appearing or diaphoretic.   HENT:      Head: Normocephalic and atraumatic.     Eyes:      General: No scleral icterus.     Extraocular Movements: Extraocular movements intact.      Conjunctiva/sclera: Conjunctivae normal.     Neck:      Thyroid: No thyromegaly.     Cardiovascular:      Rate and Rhythm: Normal rate and regular rhythm.      Heart sounds: Normal heart sounds.   Pulmonary:      Effort: Pulmonary effort is normal.      Breath sounds: Normal breath sounds.   Abdominal:      General: There is no distension.      Palpations: Abdomen is soft. There is no mass.      Tenderness: There is no abdominal tenderness. There is no guarding or rebound.      Hernia: No hernia is present.   Genitourinary:     Comments: There is a white plaque-like lesion present on the superior left Ricky vulva measuring approximately 3 cm in diameter.  There is an additional white plaque-like lesion at the perineal body on the left side which is approximately 0.3 x 1 cm.  No other visible lesions.  Speculum examination reveals gross normal vagina and cervix.  Pap smear was obtained.    Musculoskeletal:         General: No swelling or tenderness.      Cervical back: Normal range of motion and neck supple.      Right lower leg: No edema.      Left lower leg: No edema.      Comments: Digital contractures   Lymphadenopathy:      Cervical: No cervical adenopathy.     Skin:     General: Skin is warm and dry.      Coloration: Skin is not jaundiced or pale.      Findings: No lesion or rash.     Neurological:      General: No focal deficit present.      Mental Status: She is alert and  "oriented to person, place, and time. Mental status is at baseline.      Cranial Nerves: No cranial nerve deficit.      Motor: No weakness.      Gait: Gait normal.     Psychiatric:         Mood and Affect: Mood normal.         Behavior: Behavior normal.         Thought Content: Thought content normal.         Judgment: Judgment normal.       Physical Exam       Results    Labs: I have reviewed pertinent labs.   No results found for: \"\"  Lab Results   Component Value Date/Time    Potassium 4.7 02/25/2025 08:01 AM    Chloride 104 02/25/2025 08:01 AM    CO2 30 02/25/2025 08:01 AM    BUN 8 02/25/2025 08:01 AM    Creatinine 0.59 (L) 02/25/2025 08:01 AM    Calcium 9.7 02/25/2025 08:01 AM    AST 22 02/25/2025 08:01 AM    ALT 17 02/25/2025 08:01 AM    Alkaline Phosphatase 58 02/25/2025 08:01 AM    eGFR 96 02/25/2025 08:01 AM     Lab Results   Component Value Date/Time    White Blood Cell Count 6.8 02/25/2025 08:01 AM    Hemoglobin 14.2 02/25/2025 08:01 AM    HCT 42.0 02/25/2025 08:01 AM    MCV 90.3 02/25/2025 08:01 AM    Platelet Count 292 02/25/2025 08:01 AM     Lab Results   Component Value Date/Time    Neutrophils (Absolute) 3,645 02/25/2025 08:01 AM        Trend:  No results found for: \"\"      Other Study Results Review : Pathology reports reviewed.           [1]   Past Medical History:  Diagnosis Date    Anxiety and depression     Cervical cancer (HCC)     Hyperlipidemia     Lumbar spondylosis     Migraines     Osteoarthritis     Psoriasis     Psoriatic arthritis (HCC)     Uterine cancer (HCC)    [2]   Past Surgical History:  Procedure Laterality Date    TUBAL LIGATION     [3]   Family History  Problem Relation Name Age of Onset    Arthritis Family      Diabetes Family      Cancer Family      Heart disease Family      Migraines Daughter Cinthia Lobo    [4] No Active Allergies    "

## 2025-05-22 NOTE — ASSESSMENT & PLAN NOTE
70-year-old with psoriatic arthritis currently on Taltz with biopsy-proven LEONEL 3 on the left superior and inferior vulva.  Her performance status is 0.  1.  We discussed the pathophysiology of HPV mediated disease.  2.  Follow-up results of Pap smear  3.  I discussed treatment options for biopsy-proven LEONEL 3 including Aldara, CO2 laser, simple vulvectomy.  She understands that the effectiveness of Aldara will likely be reduced while on immunosuppressive therapy.  She also understands that CO2 laser has a higher risk of recurrence and will not provide a final pathologic diagnosis.  4.  I discussed the risks of examination under anesthesia, simple vulvectomy.  She understands the risks of the surgery including the potential additional risk of infection while on immunosuppressive therapy, the 30 to 50% risk of wound separation and she agrees to proceed as outlined.  Consent for surgery was obtained by me in the office.  Thank you for the courtesy of this consultation.  All questions were answered by the end of the visit.  Orders:    Case request operating room: VULVECTOMY SIMPLE; Standing    CBC and Platelet; Future    Comprehensive metabolic panel; Future    EKG 12 lead; Future    XR chest pa and lateral; Future    Liquid-based pap, diagnostic

## 2025-05-23 ENCOUNTER — TELEPHONE (OUTPATIENT)
Age: 70
End: 2025-05-23

## 2025-05-23 LAB
HPV HR 12 DNA CVX QL NAA+PROBE: NEGATIVE
HPV16 DNA CVX QL NAA+PROBE: NEGATIVE
HPV18 DNA CVX QL NAA+PROBE: NEGATIVE

## 2025-05-23 NOTE — TELEPHONE ENCOUNTER
After speaking with provider patient is okay to wait until next available in  campus. I called patient back to discuss surgery scheduling. Surgery scheduled for 08/20/2025 with Dr. Martins at [facility] Pre op and Post Op instructions were reviewed.  Post op appointment was scheduled with Dr. Martins  All questions/concerns were addressed.  Patient provided with call back number for any questions/concerns.

## 2025-05-28 LAB
LAB AP GYN PRIMARY INTERPRETATION: NORMAL
Lab: NORMAL

## 2025-08-05 ENCOUNTER — APPOINTMENT (OUTPATIENT)
Dept: LAB | Facility: HOSPITAL | Age: 70
End: 2025-08-05
Payer: COMMERCIAL

## 2025-08-05 ENCOUNTER — HOSPITAL ENCOUNTER (OUTPATIENT)
Dept: RADIOLOGY | Facility: HOSPITAL | Age: 70
Discharge: HOME | End: 2025-08-05
Payer: COMMERCIAL

## 2025-08-05 DIAGNOSIS — D07.1 VULVAR INTRAEPITHELIAL NEOPLASIA (VIN) GRADE 3: ICD-10-CM

## 2025-08-05 LAB
ALBUMIN SERPL BCG-MCNC: 4.6 G/DL (ref 3.5–5)
ALP SERPL-CCNC: 46 U/L (ref 34–104)
ALT SERPL W P-5'-P-CCNC: 15 U/L (ref 7–52)
ANION GAP SERPL CALCULATED.3IONS-SCNC: 4 MMOL/L (ref 4–13)
AST SERPL W P-5'-P-CCNC: 24 U/L (ref 13–39)
ATRIAL RATE: 68 BPM
BILIRUB SERPL-MCNC: 0.8 MG/DL (ref 0.2–1)
BUN SERPL-MCNC: 8 MG/DL (ref 5–25)
CALCIUM SERPL-MCNC: 9.4 MG/DL (ref 8.4–10.2)
CHLORIDE SERPL-SCNC: 108 MMOL/L (ref 96–108)
CO2 SERPL-SCNC: 29 MMOL/L (ref 21–32)
CREAT SERPL-MCNC: 0.58 MG/DL (ref 0.6–1.3)
ERYTHROCYTE [DISTWIDTH] IN BLOOD BY AUTOMATED COUNT: 12.3 % (ref 11.6–15.1)
GFR SERPL CREATININE-BSD FRML MDRD: 92 ML/MIN/1.73SQ M
GLUCOSE P FAST SERPL-MCNC: 99 MG/DL (ref 65–99)
HCT VFR BLD AUTO: 40.6 % (ref 34.8–46.1)
HGB BLD-MCNC: 13.7 G/DL (ref 11.5–15.4)
MCH RBC QN AUTO: 29.8 PG (ref 26.8–34.3)
MCHC RBC AUTO-ENTMCNC: 33.7 G/DL (ref 31.4–37.4)
MCV RBC AUTO: 89 FL (ref 82–98)
P AXIS: 65 DEGREES
PLATELET # BLD AUTO: 269 THOUSANDS/UL (ref 149–390)
PMV BLD AUTO: 10.6 FL (ref 8.9–12.7)
POTASSIUM SERPL-SCNC: 4.4 MMOL/L (ref 3.5–5.3)
PR INTERVAL: 188 MS
PROT SERPL-MCNC: 7.2 G/DL (ref 6.4–8.4)
QRS AXIS: -63 DEGREES
QRSD INTERVAL: 82 MS
QT INTERVAL: 424 MS
QTC INTERVAL: 451 MS
RBC # BLD AUTO: 4.59 MILLION/UL (ref 3.81–5.12)
SODIUM SERPL-SCNC: 141 MMOL/L (ref 135–147)
T WAVE AXIS: 70 DEGREES
VENTRICULAR RATE: 68 BPM
WBC # BLD AUTO: 7.03 THOUSAND/UL (ref 4.31–10.16)

## 2025-08-05 PROCEDURE — 93010 ELECTROCARDIOGRAM REPORT: CPT | Performed by: INTERNAL MEDICINE

## 2025-08-05 PROCEDURE — 36415 COLL VENOUS BLD VENIPUNCTURE: CPT

## 2025-08-05 PROCEDURE — 71046 X-RAY EXAM CHEST 2 VIEWS: CPT

## 2025-08-05 PROCEDURE — 80053 COMPREHEN METABOLIC PANEL: CPT

## 2025-08-05 PROCEDURE — 85027 COMPLETE CBC AUTOMATED: CPT

## 2025-08-20 ENCOUNTER — HOSPITAL ENCOUNTER (OUTPATIENT)
Facility: HOSPITAL | Age: 70
Setting detail: OUTPATIENT SURGERY
Discharge: HOME | End: 2025-08-20
Attending: OBSTETRICS & GYNECOLOGY | Admitting: OBSTETRICS & GYNECOLOGY
Payer: COMMERCIAL

## 2025-08-20 ENCOUNTER — ANESTHESIA (OUTPATIENT)
Dept: PERIOP | Facility: HOSPITAL | Age: 70
End: 2025-08-20
Payer: COMMERCIAL

## 2025-08-20 ENCOUNTER — ANESTHESIA EVENT (OUTPATIENT)
Dept: PERIOP | Facility: HOSPITAL | Age: 70
End: 2025-08-20
Payer: COMMERCIAL

## 2025-08-20 VITALS
TEMPERATURE: 97.7 F | DIASTOLIC BLOOD PRESSURE: 58 MMHG | BODY MASS INDEX: 21.35 KG/M2 | HEART RATE: 70 BPM | RESPIRATION RATE: 19 BRPM | SYSTOLIC BLOOD PRESSURE: 123 MMHG | HEIGHT: 63 IN | OXYGEN SATURATION: 100 % | WEIGHT: 120.5 LBS

## 2025-08-20 DIAGNOSIS — D07.1 VULVAR INTRAEPITHELIAL NEOPLASIA (VIN) GRADE 3: ICD-10-CM

## 2025-08-20 PROCEDURE — 88342 IMHCHEM/IMCYTCHM 1ST ANTB: CPT | Performed by: PATHOLOGY

## 2025-08-20 PROCEDURE — 88309 TISSUE EXAM BY PATHOLOGIST: CPT | Performed by: PATHOLOGY

## 2025-08-20 PROCEDURE — 88312 SPECIAL STAINS GROUP 1: CPT | Performed by: PATHOLOGY

## 2025-08-20 PROCEDURE — 88344 IMHCHEM/IMCYTCHM EA MLT ANTB: CPT | Performed by: PATHOLOGY

## 2025-08-20 PROCEDURE — 88305 TISSUE EXAM BY PATHOLOGIST: CPT | Performed by: PATHOLOGY

## 2025-08-20 PROCEDURE — 88341 IMHCHEM/IMCYTCHM EA ADD ANTB: CPT | Performed by: PATHOLOGY

## 2025-08-20 RX ORDER — EPHEDRINE SULFATE 50 MG/ML
INJECTION INTRAVENOUS AS NEEDED
Status: DISCONTINUED | OUTPATIENT
Start: 2025-08-20 | End: 2025-08-20

## 2025-08-20 RX ORDER — KETOROLAC TROMETHAMINE 30 MG/ML
INJECTION, SOLUTION INTRAMUSCULAR; INTRAVENOUS AS NEEDED
Status: DISCONTINUED | OUTPATIENT
Start: 2025-08-20 | End: 2025-08-20

## 2025-08-20 RX ORDER — LIDOCAINE HYDROCHLORIDE 10 MG/ML
INJECTION, SOLUTION EPIDURAL; INFILTRATION; INTRACAUDAL; PERINEURAL AS NEEDED
Status: DISCONTINUED | OUTPATIENT
Start: 2025-08-20 | End: 2025-08-20

## 2025-08-20 RX ORDER — ACETAMINOPHEN 325 MG/1
975 TABLET ORAL ONCE
Status: COMPLETED | OUTPATIENT
Start: 2025-08-20 | End: 2025-08-20

## 2025-08-20 RX ORDER — ONDANSETRON 2 MG/ML
4 INJECTION INTRAMUSCULAR; INTRAVENOUS ONCE AS NEEDED
Status: CANCELLED | OUTPATIENT
Start: 2025-08-20

## 2025-08-20 RX ORDER — CEFAZOLIN SODIUM 2 G/50ML
2000 SOLUTION INTRAVENOUS ONCE
Status: COMPLETED | OUTPATIENT
Start: 2025-08-20 | End: 2025-08-20

## 2025-08-20 RX ORDER — ONDANSETRON 2 MG/ML
INJECTION INTRAMUSCULAR; INTRAVENOUS AS NEEDED
Status: DISCONTINUED | OUTPATIENT
Start: 2025-08-20 | End: 2025-08-20

## 2025-08-20 RX ORDER — MAGNESIUM HYDROXIDE 1200 MG/15ML
LIQUID ORAL AS NEEDED
Status: DISCONTINUED | OUTPATIENT
Start: 2025-08-20 | End: 2025-08-20 | Stop reason: HOSPADM

## 2025-08-20 RX ORDER — HYDROMORPHONE HCL/PF 1 MG/ML
0.5 SYRINGE (ML) INJECTION
Refills: 0 | Status: CANCELLED | OUTPATIENT
Start: 2025-08-20

## 2025-08-20 RX ORDER — BUPIVACAINE HYDROCHLORIDE 2.5 MG/ML
INJECTION, SOLUTION EPIDURAL; INFILTRATION; INTRACAUDAL; PERINEURAL AS NEEDED
Status: DISCONTINUED | OUTPATIENT
Start: 2025-08-20 | End: 2025-08-20 | Stop reason: HOSPADM

## 2025-08-20 RX ORDER — FENTANYL CITRATE 50 UG/ML
INJECTION, SOLUTION INTRAMUSCULAR; INTRAVENOUS AS NEEDED
Status: DISCONTINUED | OUTPATIENT
Start: 2025-08-20 | End: 2025-08-20

## 2025-08-20 RX ORDER — FENTANYL CITRATE/PF 50 MCG/ML
25 SYRINGE (ML) INJECTION
Refills: 0 | Status: CANCELLED | OUTPATIENT
Start: 2025-08-20

## 2025-08-20 RX ORDER — SODIUM CHLORIDE, SODIUM LACTATE, POTASSIUM CHLORIDE, CALCIUM CHLORIDE 600; 310; 30; 20 MG/100ML; MG/100ML; MG/100ML; MG/100ML
INJECTION, SOLUTION INTRAVENOUS CONTINUOUS PRN
Status: DISCONTINUED | OUTPATIENT
Start: 2025-08-20 | End: 2025-08-20

## 2025-08-20 RX ORDER — DEXAMETHASONE SODIUM PHOSPHATE 10 MG/ML
INJECTION, SOLUTION INTRAMUSCULAR; INTRAVENOUS AS NEEDED
Status: DISCONTINUED | OUTPATIENT
Start: 2025-08-20 | End: 2025-08-20

## 2025-08-20 RX ORDER — SODIUM CHLORIDE, SODIUM LACTATE, POTASSIUM CHLORIDE, CALCIUM CHLORIDE 600; 310; 30; 20 MG/100ML; MG/100ML; MG/100ML; MG/100ML
20 INJECTION, SOLUTION INTRAVENOUS CONTINUOUS
Status: DISCONTINUED | OUTPATIENT
Start: 2025-08-20 | End: 2025-08-20 | Stop reason: HOSPADM

## 2025-08-20 RX ADMIN — SODIUM CHLORIDE, SODIUM LACTATE, POTASSIUM CHLORIDE, AND CALCIUM CHLORIDE 20 ML/HR: .6; .31; .03; .02 INJECTION, SOLUTION INTRAVENOUS at 06:38

## 2025-08-20 RX ADMIN — SODIUM CHLORIDE, SODIUM LACTATE, POTASSIUM CHLORIDE, AND CALCIUM CHLORIDE: .6; .31; .03; .02 INJECTION, SOLUTION INTRAVENOUS at 07:30

## 2025-08-20 RX ADMIN — Medication 10 MG: at 07:48

## 2025-08-20 RX ADMIN — SODIUM CHLORIDE, SODIUM LACTATE, POTASSIUM CHLORIDE, AND CALCIUM CHLORIDE: .6; .31; .03; .02 INJECTION, SOLUTION INTRAVENOUS at 08:35

## 2025-08-20 RX ADMIN — LIDOCAINE HYDROCHLORIDE 50 MG: 10 INJECTION, SOLUTION EPIDURAL; INFILTRATION; INTRACAUDAL; PERINEURAL at 07:32

## 2025-08-20 RX ADMIN — DEXAMETHASONE SODIUM PHOSPHATE 10 MG: 10 INJECTION, SOLUTION INTRAMUSCULAR; INTRAVENOUS at 07:35

## 2025-08-20 RX ADMIN — FENTANYL CITRATE 100 MCG: 50 INJECTION INTRAMUSCULAR; INTRAVENOUS at 07:32

## 2025-08-20 RX ADMIN — Medication 5 MG: at 08:06

## 2025-08-20 RX ADMIN — CEFAZOLIN SODIUM 2000 MG: 2 SOLUTION INTRAVENOUS at 07:34

## 2025-08-20 RX ADMIN — ACETAMINOPHEN 975 MG: 325 TABLET ORAL at 06:38

## 2025-08-20 RX ADMIN — ONDANSETRON 4 MG: 2 INJECTION, SOLUTION INTRAMUSCULAR; INTRAVENOUS at 07:35

## 2025-08-20 RX ADMIN — EPHEDRINE SULFATE 10 MG: 50 INJECTION INTRAVENOUS at 08:16

## 2025-08-20 RX ADMIN — KETOROLAC TROMETHAMINE 15 MG: 30 INJECTION, SOLUTION INTRAMUSCULAR; INTRAVENOUS at 08:31

## (undated) DEVICE — Device

## (undated) DEVICE — PACK PBDS HYSTEROSCOPY

## (undated) DEVICE — SUT MONOCRYL 2-0 SH 27 IN Y317H

## (undated) DEVICE — SLEEVE SCD KNEE MEDIUM

## (undated) DEVICE — DRAPE SHEET THREE QUARTER

## (undated) DEVICE — SPONGE GAUZE 4 X 4 16 PLY STRL PLASTIC TRAY LF

## (undated) DEVICE — PAD SANITARY